# Patient Record
Sex: MALE | Race: WHITE
[De-identification: names, ages, dates, MRNs, and addresses within clinical notes are randomized per-mention and may not be internally consistent; named-entity substitution may affect disease eponyms.]

---

## 2019-04-26 ENCOUNTER — HOSPITAL ENCOUNTER (OUTPATIENT)
Dept: HOSPITAL 84 - D.HCCARDIO | Age: 79
Discharge: HOME | End: 2019-04-26
Payer: MEDICARE

## 2019-04-26 DIAGNOSIS — I25.10: Primary | ICD-10-CM

## 2019-04-30 NOTE — EC
PATIENT:SEA GRAHAM                DATE OF SERVICE: 04/26/19
SEX: M                                  MEDICAL RECORD: [id number]
DATE OF BIRTH: [birth year]                        LOCATION:[facility]          
AGE OF PATIENT: 79                             ADMISSION DATE: 04/26/19
 
REFERRING PHYSICIAN:                               
 
INTERPRETING PHYSICIAN: SUSANNA RODRIGUEZ MD          
 
 
 
                             ECHOCARDIOGRAM REPORT
  ECHO CHARGES 4               ECHO COMPLETE                 Date: 04/26/19
 
 
 
CLINICAL DIAGNOSIS: H/O CAD/CABG/HTN              
 
                         ECHOCARDIOGRAPHIC MEASUREMENTS
      (adult normal given)
   AC root (d.<3.7cm) 3.4  cm   LV Septum d (<1.2 cm> 0.8  cm
      Valve Excursion 1.9  cm     LV Septum (systole) 1.2  cm
Left Atria (s.<4.0cm> 3.9  cm          LVPW d(<1.2cm) 1.0  cm
        RV (d.<2.3cm) 2.4  cm           LVPW (sytole) 1.7  cm
  LV diastole(<5.6CM) 5.4  cm       MV E-F(>70mm/sec)      cm
           LV systole 3.7  cm           LVOT Diameter 1.8  cm
       MV exc.(>10mm)      cm
Est.ejection fraction (50-75%)     %
 
   DOPPLER:
     LVIT      cm/sec A 93.0 cm/sec E 120   cm/sec
       LA      cm/sec      RVSP 37.2 mmHg
     LVOT 89.0 cm/sec   AOP1/2T      m/s
  Asc. Ao 134  cm/sec
     RVOT 56.0 cm/sec
       RA      cm/sec
         cm/sec
 AV Gradient Peak 7.2  mmHg  AV Mean 3.6  mmHg  AV Area 1.7  cm
 MV Gradient Peak 6.2  mmHg  MV Mean 2.2  mmHg  MV Area      cm
   COMMENTS: OP - HC                                      
 
 
 Cardiac Sonographer: 1               ESCOBAR GAETANO            
      Cardiologist: 3          Dr. Olvera             
             TAPE# PACS           
                                       Pericardial Effusion N                        
 
 
DATE OF SERVICE:  
 
Adequate 2-D, color-flow and spectral Doppler, and M-mode.
 
No LVH.  LV internal dimensions are normal.  Wall motion is normal.  EF is
greater than or equal to 55%.  Aortic valve is tricuspid.  No evidence of
stenosis by Doppler interrogation.  Left atrium normal at 3.9 cm.  Mitral valve
shows no prolapse.  Trace MR.  Right-sided chamber is grossly normal.  Trace TR.
 
TRANSINT:[id number] Voice Confirmation ID: [id number] DOCUMENT ID: [id number]
 
 
 
ECHOCARDIOGRAM REPORT                          [id number]    SEA GRAHAM,SUSANNA FOLEY MD          
 
 
 
Electronically Signed by SUSANNA RODRIGUEZ on 04/30/19 at 0849
 
 
 
 
 
 
 
 
 
 
 
 
 
 
 
 
 
 
 
 
 
 
 
 
 
 
 
 
 
 
 
 
 
 
 
 
 
 
 
 
CC:                                                             [id number]
DICTATION DATE: 04/29/19 1443     :     04/29/19 1559      [facility] CLI 
                                                                      04/26/19
[place] [id number] [place] [phone]

## 2020-05-20 ENCOUNTER — HOSPITAL ENCOUNTER (OUTPATIENT)
Dept: HOSPITAL 84 - D.HCCECHO | Age: 80
Discharge: HOME | End: 2020-05-20
Attending: INTERNAL MEDICINE
Payer: MEDICARE

## 2020-05-20 VITALS — BODY MASS INDEX: 22.8 KG/M2

## 2020-05-20 DIAGNOSIS — I10: Primary | ICD-10-CM

## 2020-05-21 NOTE — EC
PATIENT:SEA GRAHAM                DATE OF SERVICE: 05/20/20
SEX: M                                  MEDICAL RECORD: [id number]
DATE OF BIRTH: [birth year]                        [facility]          
AGE OF PATIENT: 80                             ADMISSION DATE: 05/20/20
 
REFERRING PHYSICIAN:                               
 
INTERPRETING PHYSICIAN: SUSANNA RODRIGUEZ MD          
 
 
 
                             ECHOCARDIOGRAM REPORT
  ECHO CHARGES 4               ECHO COMPLETE                 Date: 05/20/20
 
 
 
CLINICAL DIAGNOSIS: HTN/MITRAL AND TRICUSPID      
                    REGURG                        
                         ECHOCARDIOGRAPHIC MEASUREMENTS
      (adult normal given)
   AC root (d.<3.7cm) 3.2  cm   LV Septum d (<1.2 cm> 1.2  cm
      Valve Excursion 1.6  cm     LV Septum (systole) 1.5  cm
Left Atria (s.<4.0cm> 3.8  cm          LVPW d(<1.2cm) 1.3  cm
        RV (d.<2.3cm) 3.9  cm           LVPW (sytole) 1.7  cm
  LV diastole(<5.6CM) 4.6  cm       MV E-F(>70mm/sec)      cm
           LV systole 2.7  cm           LVOT Diameter 1.6  cm
       MV exc.(>10mm) 1.2  cm
Est.ejection fraction (50-75%)     %
 
   DOPPLER:
     LVIT      cm/sec A 96.0 cm/sec E 30.0  cm/sec
       LA      cm/sec      RVSP 32   mmHg
     LVOT 78   cm/sec   AOP1/2T      m/s
  Asc. Ao 99   cm/sec
     RVOT 84   cm/sec
       RA      cm/sec
         cm/sec
 AV Gradient Peak 3.91 mmHg  AV Mean 1.96 mmHg  AV Area 2.0  cm
 MV Gradient Peak 5.50 mmHg  MV Mean 2.16 mmHg  MV Area      cm
   COMMENTS:                                              
 
 
 Cardiac Sonographer: 2               SANDRA RUIZ              
      Cardiologist: 3          Dr. Olvera             
             TAPE# PACS           
                                       Pericardial Effusion N                        
 
 
DATE OF SERVICE:  
 
Adequate 2D, color flow imaging, spectral Doppler, and M-Mode.
 
Borderline LVH.  LV internal dimension is normal.  Wall motion is normal.  EF is
greater than or equal to 55%.  Aortic valve is tricuspid.  No evidence of
stenosis by Doppler interrogation.  Left atrium normal at 3.8 cm.  Mitral valve
shows no prolapse.  Trace MR.  Right-sided chambers are grossly normal.  Trace
TR.
 
 
 
 
ECHOCARDIOGRAM REPORT                          [id number]    SEA GRAHAM        
 
 
TRANSINT:[id number] Voice Confirmation ID: [id number] DOCUMENT ID: [id number]
                                           
                                           SUSANNA RODRIGUEZ MD          
 
 
 
Electronically Signed by SUSANNA RODRIGUEZ on 05/21/20 at 1033
 
 
 
 
 
 
 
 
 
 
 
 
 
 
 
 
 
 
 
 
 
 
 
 
 
 
 
 
 
 
 
 
 
 
 
 
 
 
 
CC:                                                             [id number]
DICTATION DATE: 05/21/20 0827     :     05/21/20 1013      [facility] CLI 
                                                                      05/20/20
[place] [id number] [place] [phone]

## 2021-05-26 ENCOUNTER — HOSPITAL ENCOUNTER (OUTPATIENT)
Dept: HOSPITAL 84 - D.HCCECHO | Age: 81
Discharge: HOME | End: 2021-05-26
Attending: INTERNAL MEDICINE
Payer: MEDICARE

## 2021-05-26 VITALS — BODY MASS INDEX: 22.8 KG/M2

## 2021-05-26 DIAGNOSIS — I25.10: Primary | ICD-10-CM

## 2021-05-27 NOTE — EC
PATIENT:SEA GRAHAM                DATE OF SERVICE: 05/26/21
SEX: M                                  MEDICAL RECORD: [id number]
DATE OF BIRTH: [birth year]                        LOCATION:[facility]          
AGE OF PATIENT: 81                             ADMISSION DATE: 05/26/21
 
REFERRING PHYSICIAN:                               
 
INTERPRETING PHYSICIAN: SUSANNA RODRIGUEZ MD          
 
 
 
                             ECHOCARDIOGRAM REPORT
  ECHO CHARGES 4               ECHO COMPLETE                 Date: 05/26/21
 
 
 
CLINICAL DIAGNOSIS: CAD/ASSESS EF/                
                    MITRAL/TRICUSPID REGURG AND   
                    AORTIC SCLEROSIS              
                         ECHOCARDIOGRAPHIC MEASUREMENTS
      (adult normal given)
   AC root (d.<3.7cm) 2.6  cm   LV Septum d (<1.2 cm> 1.0  cm
      Valve Excursion 0.9  cm     LV Septum (systole) 1.2  cm
Left Atria (s.<4.0cm> 3.7  cm          LVPW d(<1.2cm) 1.2  cm
        RV (d.<2.3cm) 3.5  cm           LVPW (sytole) 1.4  cm
  LV diastole(<5.6CM) 5.5  cm       MV E-F(>70mm/sec)      cm
           LV systole 3.9  cm           LVOT Diameter 1.7  cm
       MV exc.(>10mm) 0.9  cm
Est.ejection fraction (50-75%)     %
 
   DOPPLER:
     LVIT      cm/sec A 37.0 cm/sec E 113.0 cm/sec
       LA      cm/sec      RVSP 30   mmHg
     LVOT 88   cm/sec   AOP1/2T      m/s
  Asc. Ao 116  cm/sec
     RVOT 73   cm/sec
       RA      cm/sec
         cm/sec
 AV Gradient Peak 5.39 mmHg  AV Mean 3.07 mmHg  AV Area 1.8  cm
 MV Gradient Peak 7.04 mmHg  MV Mean 2.23 mmHg  MV Area      cm
   COMMENTS:                                              
 
 
 Cardiac Sonographer: 2               SANDRA RUIZ              
      Cardiologist: 3          Dr. Olvera             
             TAPE# PACS           
                                       Pericardial Effusion N                        
 
 
DATE OF SERVICE:  
 
Adequate 2D, color-flow imaging, spectral Doppler, and M-Mode.
FINDINGS:  No LVH.  LV internal dimensions are normal.  Wall motion is normal. 
EF is greater than or equal to 55%.  Aortic valve is sclerotic.  No evidence of
stenosis by Doppler interrogation.  Left atrium is normal 3.7 cm.  Mitral valve
shows no prolapse.  Moderate MR.  Right side is grossly normal.  Mild TR.
 
TRANSINT:[id number] Voice Confirmation ID: [id number] DOCUMENT ID: [id number]
 
 
 
ECHOCARDIOGRAM REPORT                          [id number]    SEA GRAHAM,SUSANNA FOLEY MD          
 
 
 
Electronically Signed by SUSANNA RODRIGUEZ on 05/27/21 at 0819
 
 
 
 
 
 
 
 
 
 
 
 
 
 
 
 
 
 
 
 
 
 
 
 
 
 
 
 
 
 
 
 
 
 
 
 
 
 
 
 
CC:                                                             [id number]
DICTATION DATE: 05/26/21 1625     :     05/26/21 2339      [facility] CLI 
                                                                      05/26/21
[place] [id number] [place] [phone]